# Patient Record
Sex: FEMALE | Race: WHITE | NOT HISPANIC OR LATINO
[De-identification: names, ages, dates, MRNs, and addresses within clinical notes are randomized per-mention and may not be internally consistent; named-entity substitution may affect disease eponyms.]

---

## 2018-02-09 ENCOUNTER — APPOINTMENT (OUTPATIENT)
Dept: FAMILY MEDICINE | Facility: CLINIC | Age: 53
End: 2018-02-09
Payer: COMMERCIAL

## 2018-02-09 VITALS
DIASTOLIC BLOOD PRESSURE: 74 MMHG | SYSTOLIC BLOOD PRESSURE: 116 MMHG | HEIGHT: 65 IN | OXYGEN SATURATION: 98 % | HEART RATE: 96 BPM | RESPIRATION RATE: 14 BRPM | BODY MASS INDEX: 43.32 KG/M2 | WEIGHT: 260 LBS

## 2018-02-09 DIAGNOSIS — Z82.49 FAMILY HISTORY OF ISCHEMIC HEART DISEASE AND OTHER DISEASES OF THE CIRCULATORY SYSTEM: ICD-10-CM

## 2018-02-09 DIAGNOSIS — Z83.49 FAMILY HISTORY OF OTHER ENDOCRINE, NUTRITIONAL AND METABOLIC DISEASES: ICD-10-CM

## 2018-02-09 DIAGNOSIS — Z12.31 ENCOUNTER FOR SCREENING MAMMOGRAM FOR MALIGNANT NEOPLASM OF BREAST: ICD-10-CM

## 2018-02-09 PROCEDURE — 99214 OFFICE O/P EST MOD 30 MIN: CPT | Mod: 25

## 2018-02-09 PROCEDURE — 36415 COLL VENOUS BLD VENIPUNCTURE: CPT

## 2018-02-12 LAB
ALBUMIN SERPL ELPH-MCNC: 4.1 G/DL
ALP BLD-CCNC: 66 U/L
ALT SERPL-CCNC: 20 U/L
ANION GAP SERPL CALC-SCNC: 14 MMOL/L
AST SERPL-CCNC: 20 U/L
BASOPHILS # BLD AUTO: 0.02 K/UL
BASOPHILS NFR BLD AUTO: 0.3 %
BILIRUB SERPL-MCNC: 0.5 MG/DL
BUN SERPL-MCNC: 12 MG/DL
CALCIUM SERPL-MCNC: 9.7 MG/DL
CHLORIDE SERPL-SCNC: 104 MMOL/L
CO2 SERPL-SCNC: 22 MMOL/L
CREAT SERPL-MCNC: 0.61 MG/DL
EOSINOPHIL # BLD AUTO: 0.25 K/UL
EOSINOPHIL NFR BLD AUTO: 3.3 %
GLUCOSE SERPL-MCNC: 85 MG/DL
HCT VFR BLD CALC: 41.5 %
HGB BLD-MCNC: 13.4 G/DL
IMM GRANULOCYTES NFR BLD AUTO: 0.1 %
LYMPHOCYTES # BLD AUTO: 1.95 K/UL
LYMPHOCYTES NFR BLD AUTO: 25.9 %
MAN DIFF?: NORMAL
MCHC RBC-ENTMCNC: 29.1 PG
MCHC RBC-ENTMCNC: 32.3 GM/DL
MCV RBC AUTO: 90.2 FL
MONOCYTES # BLD AUTO: 0.35 K/UL
MONOCYTES NFR BLD AUTO: 4.6 %
NEUTROPHILS # BLD AUTO: 4.96 K/UL
NEUTROPHILS NFR BLD AUTO: 65.8 %
PLATELET # BLD AUTO: 313 K/UL
POTASSIUM SERPL-SCNC: 4.4 MMOL/L
PROT SERPL-MCNC: 7 G/DL
RBC # BLD: 4.6 M/UL
RBC # FLD: 13.3 %
SODIUM SERPL-SCNC: 140 MMOL/L
TSH SERPL-ACNC: 1.54 UIU/ML
WBC # FLD AUTO: 7.54 K/UL

## 2018-05-07 ENCOUNTER — MEDICATION RENEWAL (OUTPATIENT)
Age: 53
End: 2018-05-07

## 2018-09-12 ENCOUNTER — APPOINTMENT (OUTPATIENT)
Dept: FAMILY MEDICINE | Facility: CLINIC | Age: 53
End: 2018-09-12
Payer: COMMERCIAL

## 2018-09-12 VITALS
OXYGEN SATURATION: 97 % | DIASTOLIC BLOOD PRESSURE: 76 MMHG | WEIGHT: 266 LBS | RESPIRATION RATE: 14 BRPM | BODY MASS INDEX: 44.27 KG/M2 | HEART RATE: 104 BPM | SYSTOLIC BLOOD PRESSURE: 112 MMHG | TEMPERATURE: 98.7 F

## 2018-09-12 LAB — CYTOLOGY CVX/VAG DOC THIN PREP: NORMAL

## 2018-09-12 PROCEDURE — 99213 OFFICE O/P EST LOW 20 MIN: CPT

## 2018-09-12 NOTE — PHYSICAL EXAM
[Ill-Appearing] : ill-appearing [Acutely Exhausted] : acutely exhausted [Looks Tired] : appears tired [Hoarse] : was hoarse [Normal Rhythm/Effort] : normal respiratory rhythm and effort [Clear Bilaterally] : the lungs were clear to auscultation bilaterally [Normal to Percussion] : the lungs were normal to percussion [Normal] : palpation of the chest was normal [Normal Rate] : normal [Normal S1] : normal S1 [Normal S2] : normal S2 [No Murmur] : no murmurs heard [No Pitting Edema] : no pitting edema present [2+] : left 2+ [No Abnormalities] : the abdominal aorta was not enlarged and no bruit was heard [S3] : no S3 [S4] : no S4 [Right Carotid Bruit] : no bruit heard over the right carotid [Left Carotid Bruit] : no bruit heard over the left carotid [Right Femoral Bruit] : no bruit heard over the right femoral artery [Left Femoral Bruit] : no bruit heard over the left femoral artery [de-identified] : nose so congested   ears wax in right and red in left TM

## 2018-09-12 NOTE — HISTORY OF PRESENT ILLNESS
[FreeTextEntry8] : Pt is here for head cold. Pt c/o bilateral ear pain worse on right ear, chills, sore throat, runny nose, head congestion, feeling tired for 1 day.

## 2018-09-12 NOTE — ASSESSMENT
[FreeTextEntry1] : Take antibiotics,  rest,  increase fluids, wash hands to prevent the spread of  infection . Take benedryl over the counter. Take tylenol or advil for fever or body aches. Follow up if no better or worse respiratory symptoms.\par rto for cerumen removal\par mammogram\par colonscopy

## 2018-09-12 NOTE — REVIEW OF SYSTEMS
[Earache] : earache [Nasal Discharge] : nasal discharge [Negative] : Respiratory [Sore Throat] : no sore throat

## 2019-02-14 ENCOUNTER — RX RENEWAL (OUTPATIENT)
Age: 54
End: 2019-02-14

## 2019-05-19 ENCOUNTER — RX RENEWAL (OUTPATIENT)
Age: 54
End: 2019-05-19

## 2019-08-08 ENCOUNTER — APPOINTMENT (OUTPATIENT)
Dept: FAMILY MEDICINE | Facility: CLINIC | Age: 54
End: 2019-08-08
Payer: COMMERCIAL

## 2019-08-08 ENCOUNTER — LABORATORY RESULT (OUTPATIENT)
Age: 54
End: 2019-08-08

## 2019-08-08 VITALS
RESPIRATION RATE: 14 BRPM | WEIGHT: 266 LBS | HEART RATE: 80 BPM | SYSTOLIC BLOOD PRESSURE: 112 MMHG | OXYGEN SATURATION: 98 % | BODY MASS INDEX: 44.32 KG/M2 | DIASTOLIC BLOOD PRESSURE: 80 MMHG | HEIGHT: 65 IN

## 2019-08-08 DIAGNOSIS — Z87.09 PERSONAL HISTORY OF OTHER DISEASES OF THE RESPIRATORY SYSTEM: ICD-10-CM

## 2019-08-08 PROCEDURE — 99214 OFFICE O/P EST MOD 30 MIN: CPT

## 2019-08-08 RX ORDER — BETAMETHASONE DIPROPIONATE 0.5 MG/G
0.05 CREAM TOPICAL TWICE DAILY
Qty: 60 | Refills: 3 | Status: COMPLETED | COMMUNITY
Start: 2018-02-09 | End: 2019-08-08

## 2019-08-08 RX ORDER — AMOXICILLIN AND CLAVULANATE POTASSIUM 875; 125 MG/1; MG/1
875-125 TABLET, COATED ORAL
Qty: 20 | Refills: 0 | Status: COMPLETED | COMMUNITY
Start: 2018-09-12 | End: 2019-08-08

## 2019-08-08 NOTE — ASSESSMENT
[FreeTextEntry1] : Basic cardiovascular prevention measures are advised including regular exercise, surveillance medical examination, and prudent portion-controlled low fat diet, rich in a variety of vegetables with minimal added sugars, refined starches, and no artificially hydrogenated oils.\par Labs drawn/ specimens obtained  in office on this date of service  for evaluation of   assessed conditions -  full blood work    to be run at [facility].\par ear irrigated. \par renew medications\par colonoscopy\par mammogram\par follow up gyn for pap\par We spent sufficient time to discuss aspects of care; questions were answered  to patient's satisfaction.The diagnosis and care plan were discussed with patient in detail.  Patient test results were  reviewed and explained in full. All questions and concerns  were answered to the best of my knowledge.\par

## 2019-08-08 NOTE — HISTORY OF PRESENT ILLNESS
[FreeTextEntry1] : pt presents for med check  [de-identified] : 52 yo wf her for follow up on hypothyroid . Her  is very sick and he has very bad diabetes and developed  sepsis and they had to amputate his leg. He is very sick and now has pneumonia. He is in [facility]. They are going to send him to rehab.  My brother past away in September . It has been a hell of a year. I have to get back on track and go to gyn and get mammo and colonoscopy . I just havent been able to  because of my .

## 2019-08-08 NOTE — PHYSICAL EXAM
[Normal] : normal rate, regular rhythm, normal S1 and S2 and no murmur heard [de-identified] : emotional [de-identified] : right ear wax

## 2019-08-08 NOTE — HEALTH RISK ASSESSMENT
[] : No [No] : In the past 12 months have you used drugs other than those required for medical reasons? No [No falls in past year] : Patient reported no falls in the past year [0] : 2) Feeling down, depressed, or hopeless: Not at all (0) [de-identified] : no [de-identified] : no [Audit-CScore] : 0 [de-identified] : normal [de-identified] : no [JFK3Mtfge] : 0

## 2019-08-12 LAB
ALBUMIN SERPL ELPH-MCNC: 4.3 G/DL
ALP BLD-CCNC: 62 U/L
ALT SERPL-CCNC: 20 U/L
ANION GAP SERPL CALC-SCNC: 11 MMOL/L
AST SERPL-CCNC: 15 U/L
BASOPHILS # BLD AUTO: 0.04 K/UL
BASOPHILS NFR BLD AUTO: 0.7 %
BILIRUB SERPL-MCNC: 0.8 MG/DL
BUN SERPL-MCNC: 13 MG/DL
CALCIUM SERPL-MCNC: 9.7 MG/DL
CHLORIDE SERPL-SCNC: 105 MMOL/L
CHOLEST SERPL-MCNC: 192 MG/DL
CHOLEST/HDLC SERPL: 3.2 RATIO
CO2 SERPL-SCNC: 26 MMOL/L
CREAT SERPL-MCNC: 0.62 MG/DL
EOSINOPHIL # BLD AUTO: 0.16 K/UL
EOSINOPHIL NFR BLD AUTO: 2.7 %
ESTIMATED AVERAGE GLUCOSE: 114 MG/DL
FOLATE SERPL-MCNC: >20 NG/ML
GLUCOSE SERPL-MCNC: 99 MG/DL
HBA1C MFR BLD HPLC: 5.6 %
HCT VFR BLD CALC: 45.1 %
HDLC SERPL-MCNC: 60 MG/DL
HGB BLD-MCNC: 14 G/DL
IMM GRANULOCYTES NFR BLD AUTO: 0.2 %
IRON SERPL-MCNC: 81 UG/DL
LDLC SERPL CALC-MCNC: 114 MG/DL
LYMPHOCYTES # BLD AUTO: 1.18 K/UL
LYMPHOCYTES NFR BLD AUTO: 20.3 %
MAN DIFF?: NORMAL
MCHC RBC-ENTMCNC: 28.2 PG
MCHC RBC-ENTMCNC: 31 GM/DL
MCV RBC AUTO: 90.9 FL
MONOCYTES # BLD AUTO: 0.37 K/UL
MONOCYTES NFR BLD AUTO: 6.4 %
NEUTROPHILS # BLD AUTO: 4.06 K/UL
NEUTROPHILS NFR BLD AUTO: 69.7 %
PLATELET # BLD AUTO: 289 K/UL
POTASSIUM SERPL-SCNC: 4.7 MMOL/L
PROT SERPL-MCNC: 6.6 G/DL
RBC # BLD: 4.96 M/UL
RBC # FLD: 13.5 %
SODIUM SERPL-SCNC: 142 MMOL/L
T3 SERPL-MCNC: 130 NG/DL
T3FREE SERPL-MCNC: 3.4 PG/ML
T3RU NFR SERPL: 1 TBI
T4 FREE SERPL-MCNC: 1.7 NG/DL
TRIGL SERPL-MCNC: 88 MG/DL
TSH SERPL-ACNC: 0.86 UIU/ML
VIT B12 SERPL-MCNC: 268 PG/ML
WBC # FLD AUTO: 5.82 K/UL

## 2020-09-03 ENCOUNTER — RX RENEWAL (OUTPATIENT)
Age: 55
End: 2020-09-03

## 2020-10-27 ENCOUNTER — APPOINTMENT (OUTPATIENT)
Dept: FAMILY MEDICINE | Facility: CLINIC | Age: 55
End: 2020-10-27
Payer: COMMERCIAL

## 2020-10-27 VITALS
OXYGEN SATURATION: 98 % | SYSTOLIC BLOOD PRESSURE: 124 MMHG | DIASTOLIC BLOOD PRESSURE: 82 MMHG | HEIGHT: 65 IN | TEMPERATURE: 97.8 F | WEIGHT: 285 LBS | RESPIRATION RATE: 14 BRPM | HEART RATE: 68 BPM | BODY MASS INDEX: 47.48 KG/M2

## 2020-10-27 DIAGNOSIS — Z23 ENCOUNTER FOR IMMUNIZATION: ICD-10-CM

## 2020-10-27 LAB — CYTOLOGY CVX/VAG DOC THIN PREP: NORMAL

## 2020-10-27 PROCEDURE — 90686 IIV4 VACC NO PRSV 0.5 ML IM: CPT

## 2020-10-27 PROCEDURE — 99072 ADDL SUPL MATRL&STAF TM PHE: CPT

## 2020-10-27 PROCEDURE — 36415 COLL VENOUS BLD VENIPUNCTURE: CPT

## 2020-10-27 PROCEDURE — G0008: CPT

## 2020-10-27 PROCEDURE — 99213 OFFICE O/P EST LOW 20 MIN: CPT | Mod: 25

## 2020-10-27 NOTE — ASSESSMENT
[FreeTextEntry1] : Labs drawn/ specimens obtained  in office on this date of service  for evaluation of   assessed conditions - tft     to be run at [facility].\par reduce to [id number] mcg \par  Information regarding flu shot reviewed. All questions answered.Flu shot .5 cc IM  right    deltoid . No reaction noted. Advised patients to wash hands to reduce the spread of infection.\par mammogram

## 2020-10-27 NOTE — REVIEW OF SYSTEMS
[Joint Pain] : joint pain [Insomnia] : insomnia [Negative] : Respiratory [FreeTextEntry9] : wrist pain and hand pain and foot pain

## 2020-10-27 NOTE — HISTORY OF PRESENT ILLNESS
[FreeTextEntry1] : patient presents for medication renewal\par  [de-identified] : 56 yo wf here for follow up on thyroid she is on levothyroxine 150 mcg\par she would like to lower the dose just a little. \par she is not sleeping she is still getting her menses. she has no hot flashes. she has 3 c coffee daily. \par she is working from home and she has to do a lot of computers and doesn’t have the proper desk and is getting cts \par I also have feet problems- left foot plantar fasciitis.\par I am doing exercises. but cant exercise too much

## 2020-10-28 LAB
T3 SERPL-MCNC: 121 NG/DL
T3FREE SERPL-MCNC: 3.11 PG/ML
T4 FREE SERPL-MCNC: 1.4 NG/DL
TSH SERPL-ACNC: 2.55 UIU/ML

## 2021-04-25 ENCOUNTER — RX RENEWAL (OUTPATIENT)
Age: 56
End: 2021-04-25

## 2021-07-30 ENCOUNTER — TRANSCRIPTION ENCOUNTER (OUTPATIENT)
Age: 56
End: 2021-07-30

## 2021-07-31 ENCOUNTER — APPOINTMENT (OUTPATIENT)
Dept: FAMILY MEDICINE | Facility: CLINIC | Age: 56
End: 2021-07-31
Payer: COMMERCIAL

## 2021-07-31 VITALS
WEIGHT: 286 LBS | OXYGEN SATURATION: 98 % | HEART RATE: 79 BPM | BODY MASS INDEX: 47.65 KG/M2 | DIASTOLIC BLOOD PRESSURE: 80 MMHG | HEIGHT: 65 IN | RESPIRATION RATE: 14 BRPM | SYSTOLIC BLOOD PRESSURE: 132 MMHG

## 2021-07-31 VITALS — TEMPERATURE: 98.2 F

## 2021-07-31 PROCEDURE — 36415 COLL VENOUS BLD VENIPUNCTURE: CPT

## 2021-07-31 PROCEDURE — 99213 OFFICE O/P EST LOW 20 MIN: CPT | Mod: 25

## 2021-07-31 NOTE — ASSESSMENT
[FreeTextEntry1] : Basic cardiovascular prevention measures are advised including regular exercise, surveillance medical examination, and prudent portion-controlled low fat diet, rich in a variety of vegetables with minimal added sugars, refined starches, and no artificially hydrogenated oils.\par Discussion and interpretation of previous tests , external notes( labs, radiology, specialist  , patient verbalized understanding.\par Prescription drug management trial abx for sinus. if no better follow up ent\par Labs drawn/ specimens obtained  in office on this date of service  for evaluation of   assessed conditions -    cbc cmp tft lipid  to be run at [facility].\par discussed gsc turmeric for joints

## 2021-07-31 NOTE — HISTORY OF PRESENT ILLNESS
[FreeTextEntry8] : pt c/o nose bleeds +ha +sinus pressure  x 2 episodes since Tuesday \par \par she takes motrin once in a while.\par + ha sinus at bridge of nose\par issues w allergy \par no coughing\par no menses since march\par she is perimenopausal\par no bruising\par no rectal bleeding.

## 2021-07-31 NOTE — PHYSICAL EXAM
[Normal] : no respiratory distress, lungs were clear to auscultation bilaterally and no accessory muscle use [de-identified] : no obvious artery or inflammation

## 2021-08-02 LAB
24R-OH-CALCIDIOL SERPL-MCNC: 23.4 PG/ML
ALBUMIN SERPL ELPH-MCNC: 4.4 G/DL
ALP BLD-CCNC: 78 U/L
ALT SERPL-CCNC: 32 U/L
ANION GAP SERPL CALC-SCNC: 10 MMOL/L
APTT BLD: 40.2 SEC
AST SERPL-CCNC: 23 U/L
BASOPHILS # BLD AUTO: 0.04 K/UL
BASOPHILS NFR BLD AUTO: 0.7 %
BILIRUB SERPL-MCNC: 0.9 MG/DL
BUN SERPL-MCNC: 12 MG/DL
CALCIUM SERPL-MCNC: 9.8 MG/DL
CHLORIDE SERPL-SCNC: 103 MMOL/L
CHOLEST SERPL-MCNC: 184 MG/DL
CO2 SERPL-SCNC: 26 MMOL/L
CREAT SERPL-MCNC: 0.6 MG/DL
EOSINOPHIL # BLD AUTO: 0.17 K/UL
EOSINOPHIL NFR BLD AUTO: 2.9 %
ESTIMATED AVERAGE GLUCOSE: 117 MG/DL
FERRITIN SERPL-MCNC: 68 NG/ML
FOLATE SERPL-MCNC: >20 NG/ML
GLUCOSE SERPL-MCNC: 103 MG/DL
HBA1C MFR BLD HPLC: 5.7 %
HCT VFR BLD CALC: 46 %
HDLC SERPL-MCNC: 59 MG/DL
HGB BLD-MCNC: 14.4 G/DL
IMM GRANULOCYTES NFR BLD AUTO: 0.2 %
INR PPP: 1.01 RATIO
IRON SATN MFR SERPL: 19 %
IRON SERPL-MCNC: 78 UG/DL
LDLC SERPL CALC-MCNC: 108 MG/DL
LYMPHOCYTES # BLD AUTO: 1.29 K/UL
LYMPHOCYTES NFR BLD AUTO: 22.2 %
MAGNESIUM SERPL-MCNC: 2 MG/DL
MAN DIFF?: NORMAL
MCHC RBC-ENTMCNC: 28.5 PG
MCHC RBC-ENTMCNC: 31.3 GM/DL
MCV RBC AUTO: 90.9 FL
MONOCYTES # BLD AUTO: 0.3 K/UL
MONOCYTES NFR BLD AUTO: 5.2 %
NEUTROPHILS # BLD AUTO: 4 K/UL
NEUTROPHILS NFR BLD AUTO: 68.8 %
NONHDLC SERPL-MCNC: 125 MG/DL
PLATELET # BLD AUTO: 255 K/UL
POTASSIUM SERPL-SCNC: 4.9 MMOL/L
PROT SERPL-MCNC: 7 G/DL
PT BLD: 12 SEC
RBC # BLD: 5.06 M/UL
RBC # FLD: 13.7 %
SODIUM SERPL-SCNC: 139 MMOL/L
T3 SERPL-MCNC: 130 NG/DL
T3FREE SERPL-MCNC: 3.07 PG/ML
T4 FREE SERPL-MCNC: 1.4 NG/DL
TIBC SERPL-MCNC: 399 UG/DL
TRIGL SERPL-MCNC: 87 MG/DL
TSH SERPL-ACNC: 1.83 UIU/ML
UIBC SERPL-MCNC: 321 UG/DL
VIT B12 SERPL-MCNC: 378 PG/ML
WBC # FLD AUTO: 5.81 K/UL

## 2021-08-24 ENCOUNTER — TRANSCRIPTION ENCOUNTER (OUTPATIENT)
Age: 56
End: 2021-08-24

## 2021-10-01 ENCOUNTER — APPOINTMENT (OUTPATIENT)
Dept: FAMILY MEDICINE | Facility: CLINIC | Age: 56
End: 2021-10-01
Payer: COMMERCIAL

## 2021-10-01 PROCEDURE — 99441: CPT

## 2021-10-01 RX ORDER — AMOXICILLIN AND CLAVULANATE POTASSIUM 875; 125 MG/1; MG/1
875-125 TABLET, COATED ORAL
Qty: 20 | Refills: 0 | Status: COMPLETED | COMMUNITY
Start: 2021-07-31 | End: 2021-10-01

## 2021-10-01 RX ORDER — TRAMADOL HYDROCHLORIDE 50 MG/1
50 TABLET, COATED ORAL
Qty: 60 | Refills: 5 | Status: COMPLETED | COMMUNITY
Start: 2018-02-09 | End: 2021-10-01

## 2021-10-01 NOTE — HISTORY OF PRESENT ILLNESS
[Home] : at home, [unfilled] , at the time of the visit. [Medical Office: ([facility])___] : at the medical office located in  [Verbal consent obtained from patient] : the patient, [unfilled] [FreeTextEntry8] : Patient initiated communication for concern via HIPAA secure platform  (Telemedicine )  for                      using              .Reviewed quarantine instructions and self isolation to limit spread of disease  as per BEN guidelines.  Patient is an established patient and has verbalized consent to be treated via telemedicine .\par Carlos daughter on Sat had co of allergies. she is not vaccinated. monday  had a bronchial respiratory co's and it got worse. daughter  got tested tuesday + rapid  test. Her  got a pcr test. tuesday he had a fever he went to hosp and was admitted\par She went for rapid test neg. pcr pending. Her concern is she feels a head cold and her throat is raspy and is coughing 3 d w phlegm, no fever  she has sense of taste and smell. she went to [facility]. the dr's suggested if she is neg and if she is not feeling well she should repeat test rapid.  she is tired and overwhelmed\par Sera had test was neg

## 2021-10-01 NOTE — ASSESSMENT
[FreeTextEntry1] : Discussed the following risk reducing strategies. - Wash hands with soap and water , use alcohol based hand  when hand washing is not an option. Maintain social distancing of at least 6 feet whenever possible , avoid hand shaking, avoid touching eyes, nose and mouth, cover mouth when coughing or sneezing, avoid close contact with sick people. seek medical help if fever, or worse symptoms cough chest pain, or shortness of breath.\par Please note this assessment was done using telemedicine as a result of social distancing due to the outbreak of COVID 19 .\par rapid neg. pcr pending. if she gets worse suggest repeat testing she might have tested too soon. Or go to the hospital  if worse .

## 2021-10-04 ENCOUNTER — NON-APPOINTMENT (OUTPATIENT)
Age: 56
End: 2021-10-04

## 2022-01-24 ENCOUNTER — NON-APPOINTMENT (OUTPATIENT)
Age: 57
End: 2022-01-24

## 2022-01-24 ENCOUNTER — APPOINTMENT (OUTPATIENT)
Dept: FAMILY MEDICINE | Facility: CLINIC | Age: 57
End: 2022-01-24
Payer: COMMERCIAL

## 2022-01-24 VITALS
RESPIRATION RATE: 14 BRPM | OXYGEN SATURATION: 97 % | HEART RATE: 98 BPM | WEIGHT: 286 LBS | HEIGHT: 65 IN | BODY MASS INDEX: 47.65 KG/M2 | SYSTOLIC BLOOD PRESSURE: 142 MMHG | DIASTOLIC BLOOD PRESSURE: 76 MMHG

## 2022-01-24 DIAGNOSIS — Z00.00 ENCOUNTER FOR GENERAL ADULT MEDICAL EXAMINATION W/OUT ABNORMAL FINDINGS: ICD-10-CM

## 2022-01-24 DIAGNOSIS — Z86.69 PERSONAL HISTORY OF OTHER DISEASES OF THE NERVOUS SYSTEM AND SENSE ORGANS: ICD-10-CM

## 2022-01-24 DIAGNOSIS — R53.82 CHRONIC FATIGUE, UNSPECIFIED: ICD-10-CM

## 2022-01-24 DIAGNOSIS — Z12.11 ENCOUNTER FOR SCREENING FOR MALIGNANT NEOPLASM OF COLON: ICD-10-CM

## 2022-01-24 LAB — CYTOLOGY CVX/VAG DOC THIN PREP: NORMAL

## 2022-01-24 PROCEDURE — 93000 ELECTROCARDIOGRAM COMPLETE: CPT

## 2022-01-24 PROCEDURE — 99396 PREV VISIT EST AGE 40-64: CPT | Mod: 25

## 2022-01-24 RX ORDER — DICLOFENAC SODIUM 10 MG/G
1 GEL TOPICAL
Qty: 100 | Refills: 0 | Status: COMPLETED | COMMUNITY
Start: 2017-06-22 | End: 2022-01-24

## 2022-01-24 RX ORDER — MELOXICAM 15 MG/1
15 TABLET ORAL DAILY
Qty: 90 | Refills: 0 | Status: COMPLETED | COMMUNITY
Start: 2018-02-09 | End: 2022-01-24

## 2022-01-24 RX ORDER — MOMETASONE FUROATE 1 MG/G
0.1 CREAM TOPICAL TWICE DAILY
Qty: 1 | Refills: 6 | Status: COMPLETED | COMMUNITY
Start: 2018-02-09 | End: 2022-01-24

## 2022-01-24 NOTE — HISTORY OF PRESENT ILLNESS
[FreeTextEntry1] : Patient presents for physical exam [de-identified] : Presenting in office for physical examination, she lost her  in october to COVID and she is having lots of joint pain and fatigue. She was told her mother had lupus but is not sure. She is tearful on examination, and is concerned aboout her health. Her knees hands arms and she has no energy to walk or exercise.

## 2022-01-24 NOTE — PLAN
[FreeTextEntry1] : Physical examination\par will order routine lab work to be drawn at outside lab\par \par fatigue and joint pain\par will order inflammatory labs/tick panel/ r[facility]\par \par recommend healthy lifestyle including exercising for 150 minutes of engaging cardio per week and Mediterranean diet. \par \par hypothyroid\par will order thyroid panel\par thyroid u.s as she has not had one in a long time\par \par recommend colonoscopy- referral given\par reviewed mammogram- BIRADS1 negative\par \par she has been grieving since the loss of her \par she has a good support system at home and is feeling better as time passes, she is not interested in taking medication but will call if she feels helpless\par \par EKG - t wave abnormality\par recommend she see cardiology again\par denies chest pain but does get SOB with exertion\par \par will call with results, RTO as needed.

## 2022-01-24 NOTE — HEALTH RISK ASSESSMENT
[Fair] :  ~his/her~ mood as fair [Intercurrent Urgi Care visits] : went to urgent care [Never] : Never [No] : In the past 12 months have you used drugs other than those required for medical reasons? No [No falls in past year] : Patient reported no falls in the past year [3] : 2) Feeling down, depressed, or hopeless for nearly every day (3) [PHQ-2 Positive] : PHQ-2 Positive [No Retinopathy] : No retinopathy [Patient declined bone density test] : Patient declined bone density test [HIV test declined] : HIV test declined [Hepatitis C test declined] : Hepatitis C test declined [None] : None [Alone] : lives alone [Employed] : employed [College] : College [] :  [Feels Safe at Home] : Feels safe at home [Fully functional (bathing, dressing, toileting, transferring, walking, feeding)] : Fully functional (bathing, dressing, toileting, transferring, walking, feeding) [Fully functional (using the telephone, shopping, preparing meals, housekeeping, doing laundry, using] : Fully functional and needs no help or supervision to perform IADLs (using the telephone, shopping, preparing meals, housekeeping, doing laundry, using transportation, managing medications and managing finances) [Reports normal functional visual acuity (ie: able to read med bottle)] : Reports normal functional visual acuity [Smoke Detector] : smoke detector [Carbon Monoxide Detector] : carbon monoxide detector [Safety elements used in home] : safety elements used in home [Seat Belt] :  uses seat belt [Sunscreen] : uses sunscreen [Patient/Caregiver not ready to engage] : , patient/caregiver not ready to engage [Several Days (1)] : 2.) Feeling down, depressed or hopeless? Several days [1/2 of Days or More (2)] : 4.) Feeling tired or having little energy? Half the days or more [Not at All (0)] : 8.) Moving or speaking so slowly that other people could have noticed, or the opposite, moving or speaking faster than usual? Not at all [Mild] : severity of depression is mild [Not at all] : How difficult have these problems made it for you to do your work, take care of things at home, or get along with people? Not at all [PHQ-9 Positive] : PHQ-9 Positive [FreeTextEntry1] : joint pain, fatigue.  [de-identified] : COVID Testing [de-identified] : occasional walking  [de-identified] : denies  [XSQ8Mfxxs] : 6 [[id number]] : 6 [EyeExamDate] : 2021 [Change in mental status noted] : No change in mental status noted [Language] : denies difficulty with language [Behavior] : denies difficulty with behavior [Learning/Retaining New Information] : denies difficulty learning/retaining new information [Handling Complex Tasks] : denies difficulty handling complex tasks [Reasoning] : denies difficulty with reasoning [Spatial Ability and Orientation] : denies difficulty with spatial ability and orientation [Sexually Active] : not sexually active [High Risk Behavior] : no high risk behavior [Reports changes in hearing] : Reports no changes in hearing [Reports changes in vision] : Reports no changes in vision [Reports changes in dental health] : Reports no changes in dental health [TB Exposure] : is not being exposed to tuberculosis [Caregiver Concerns] : does not have caregiver concerns [MammogramDate] : 8/2021 [PapSmearDate] : 8/2021 [ColonoscopyComments] : rx given  [FreeTextEntry4] : give health care proxy

## 2022-02-16 ENCOUNTER — NON-APPOINTMENT (OUTPATIENT)
Age: 57
End: 2022-02-16

## 2022-02-16 ENCOUNTER — APPOINTMENT (OUTPATIENT)
Dept: CARDIOLOGY | Facility: CLINIC | Age: 57
End: 2022-02-16
Payer: COMMERCIAL

## 2022-02-16 VITALS
SYSTOLIC BLOOD PRESSURE: 140 MMHG | DIASTOLIC BLOOD PRESSURE: 80 MMHG | BODY MASS INDEX: 47.65 KG/M2 | HEART RATE: 94 BPM | HEIGHT: 65 IN | OXYGEN SATURATION: 98 % | WEIGHT: 286 LBS

## 2022-02-16 DIAGNOSIS — R94.31 ABNORMAL ELECTROCARDIOGRAM [ECG] [EKG]: ICD-10-CM

## 2022-02-16 PROCEDURE — 93000 ELECTROCARDIOGRAM COMPLETE: CPT

## 2022-02-16 PROCEDURE — 99244 OFF/OP CNSLTJ NEW/EST MOD 40: CPT | Mod: 25

## 2022-02-16 NOTE — DISCUSSION/SUMMARY
[FreeTextEntry1] : Pt is a 55 y/o F with hypothyroidism, BMI 47 p/w abnormal ECG\par Will check transthoracic echocardiogram to evaluate left ventricular function and assess for any structural abnormalities\par Check nuc stress test to eval for ishemia\par BP mildly elevated - monitor closely\par The described plan was discussed with the pt.  All questions and concerns were addressed to the best of my knowledge.

## 2022-02-16 NOTE — HISTORY OF PRESENT ILLNESS
[FreeTextEntry1] : Pt is a 55 y/o F who is referred here today by their PCP for evaluation abnormal ECG.  She has PMH hypothyroidism, BMI 47.  She states that she is feeling well overall - denies CP, SOB, diaphoresis, palpitations, dizziness, syncope, LE edema, PND, orthopnea. \par   from COVID 10/2021\par COVID vaccine 2021, booster 2022\par \par PMH: hypothyroidism, BMI 47\par Family hx: father HTN\par Smoking status: never\par no ETOH\par no drug use\par Current exercise: none\par Daily water intake: 36-50 oz\par Daily caffeine intake: 2 cups coffee\par OTC medications: MVI, B12, vit C/D\par Previous cardiac testing: none\par Previous hospitalizations: none\par 2 children - no problem with pregnancies\par

## 2022-03-18 ENCOUNTER — APPOINTMENT (OUTPATIENT)
Dept: CARDIOLOGY | Facility: CLINIC | Age: 57
End: 2022-03-18
Payer: COMMERCIAL

## 2022-03-18 PROCEDURE — 93306 TTE W/DOPPLER COMPLETE: CPT

## 2022-03-23 ENCOUNTER — TRANSCRIPTION ENCOUNTER (OUTPATIENT)
Age: 57
End: 2022-03-23

## 2022-03-24 ENCOUNTER — APPOINTMENT (OUTPATIENT)
Dept: CARDIOLOGY | Facility: CLINIC | Age: 57
End: 2022-03-24
Payer: COMMERCIAL

## 2022-03-24 PROCEDURE — 93018 CV STRESS TEST I&R ONLY: CPT

## 2022-03-24 PROCEDURE — 93017 CV STRESS TEST TRACING ONLY: CPT

## 2022-03-24 PROCEDURE — 78452 HT MUSCLE IMAGE SPECT MULT: CPT

## 2022-03-24 PROCEDURE — A9500: CPT

## 2022-03-29 ENCOUNTER — TRANSCRIPTION ENCOUNTER (OUTPATIENT)
Age: 57
End: 2022-03-29

## 2022-03-31 ENCOUNTER — TRANSCRIPTION ENCOUNTER (OUTPATIENT)
Age: 57
End: 2022-03-31

## 2022-04-22 ENCOUNTER — TRANSCRIPTION ENCOUNTER (OUTPATIENT)
Age: 57
End: 2022-04-22

## 2022-04-22 ENCOUNTER — RX RENEWAL (OUTPATIENT)
Age: 57
End: 2022-04-22

## 2022-06-07 ENCOUNTER — APPOINTMENT (OUTPATIENT)
Dept: FAMILY MEDICINE | Facility: CLINIC | Age: 57
End: 2022-06-07
Payer: COMMERCIAL

## 2022-06-07 VITALS
RESPIRATION RATE: 15 BRPM | BODY MASS INDEX: 48.65 KG/M2 | TEMPERATURE: 98 F | DIASTOLIC BLOOD PRESSURE: 80 MMHG | OXYGEN SATURATION: 98 % | HEART RATE: 88 BPM | HEIGHT: 65 IN | SYSTOLIC BLOOD PRESSURE: 118 MMHG | WEIGHT: 292 LBS

## 2022-06-07 DIAGNOSIS — Z87.898 PERSONAL HISTORY OF OTHER SPECIFIED CONDITIONS: ICD-10-CM

## 2022-06-07 DIAGNOSIS — R06.02 SHORTNESS OF BREATH: ICD-10-CM

## 2022-06-07 DIAGNOSIS — Z86.19 PERSONAL HISTORY OF OTHER INFECTIOUS AND PARASITIC DISEASES: ICD-10-CM

## 2022-06-07 DIAGNOSIS — Z87.09 PERSONAL HISTORY OF OTHER DISEASES OF THE RESPIRATORY SYSTEM: ICD-10-CM

## 2022-06-07 PROCEDURE — 36415 COLL VENOUS BLD VENIPUNCTURE: CPT

## 2022-06-07 PROCEDURE — 99213 OFFICE O/P EST LOW 20 MIN: CPT | Mod: 25

## 2022-06-07 NOTE — REVIEW OF SYSTEMS
[Joint Pain] : joint pain [Negative] : Respiratory [Recent Change In Weight] : ~T recent weight change [Joint Stiffness] : joint stiffness

## 2022-06-08 LAB
BASOPHILS # BLD AUTO: 0.06 K/UL
BASOPHILS NFR BLD AUTO: 0.9 %
EOSINOPHIL # BLD AUTO: 0.23 K/UL
EOSINOPHIL NFR BLD AUTO: 3.4 %
HCT VFR BLD CALC: 44.4 %
HGB BLD-MCNC: 13.9 G/DL
IMM GRANULOCYTES NFR BLD AUTO: 0.3 %
LYMPHOCYTES # BLD AUTO: 1.42 K/UL
LYMPHOCYTES NFR BLD AUTO: 21.3 %
MAN DIFF?: NORMAL
MCHC RBC-ENTMCNC: 28.5 PG
MCHC RBC-ENTMCNC: 31.3 GM/DL
MCV RBC AUTO: 91.2 FL
MONOCYTES # BLD AUTO: 0.41 K/UL
MONOCYTES NFR BLD AUTO: 6.1 %
NEUTROPHILS # BLD AUTO: 4.53 K/UL
NEUTROPHILS NFR BLD AUTO: 68 %
PLATELET # BLD AUTO: 254 K/UL
RBC # BLD: 4.87 M/UL
RBC # FLD: 13.9 %
T3 SERPL-MCNC: 119 NG/DL
T3FREE SERPL-MCNC: 3 PG/ML
T4 FREE SERPL-MCNC: 1.6 NG/DL
TSH SERPL-ACNC: 2.4 UIU/ML
WBC # FLD AUTO: 6.67 K/UL

## 2022-06-09 LAB
ALBUMIN SERPL ELPH-MCNC: 4.6 G/DL
ALP BLD-CCNC: 77 U/L
ALT SERPL-CCNC: 28 U/L
ANION GAP SERPL CALC-SCNC: 13 MMOL/L
AST SERPL-CCNC: 25 U/L
BILIRUB SERPL-MCNC: 0.8 MG/DL
BUN SERPL-MCNC: 13 MG/DL
CALCIUM SERPL-MCNC: 9.6 MG/DL
CHLORIDE SERPL-SCNC: 102 MMOL/L
CO2 SERPL-SCNC: 27 MMOL/L
CREAT SERPL-MCNC: 0.69 MG/DL
EGFR: 102 ML/MIN/1.73M2
GLUCOSE SERPL-MCNC: 102 MG/DL
POTASSIUM SERPL-SCNC: 4.5 MMOL/L
PROT SERPL-MCNC: 7.1 G/DL
SODIUM SERPL-SCNC: 142 MMOL/L

## 2022-06-11 NOTE — HISTORY OF PRESENT ILLNESS
[FreeTextEntry1] : Patient presents for medication renewals and labs  [de-identified] : 57 yo wf here for follow up on hypothyroidism\par \par she had her cpe- she  complaining of aches and was dx w RA . she sees  rheumatology Dr Cotter . she wants me to see derm to rule out psoriatic arthritis.  She sees Maureen. Anuja Genao. in July\par I am feeling better. . Dr Cotter did a whole work up - mri on her back\par \par My   in October of covid. We were together 34 y. \par I knew he was sick w diabetes and chronic kidney disease. he was only 61 y  He was retired and I was going to retire too\par I had covid and i got mab and was better in 24 hrs. for some reason they did not give it to him. He  on 10/7/22 I was devastated.

## 2022-06-11 NOTE — ASSESSMENT
[FreeTextEntry1] : Basic cardiovascular prevention measures are advised including regular exercise, surveillance medical examination, and prudent portion-controlled low fat diet, rich in a variety of vegetables with minimal added sugars, refined starches, and no artificially hydrogenated oils.\par Discussion and interpretation of previous tests , external notes( labs, radiology, specialist  , patient verbalized understanding.\par Prescription drug management\par will renew levothyroxine when labs results in\par thyroid sonogram\par colonoscopy screening neoplasm

## 2022-06-11 NOTE — HEALTH RISK ASSESSMENT
[Never] : Never [No] : In the past 12 months have you used drugs other than those required for medical reasons? No [No falls in past year] : Patient reported no falls in the past year [Intercurrent ED visits] : went to ED [0] : 2) Feeling down, depressed, or hopeless: Not at all (0) [PHQ-2 Negative - No further assessment needed] : PHQ-2 Negative - No further assessment needed [de-identified] : covid-mab [Audit-CScore] : 0 [VIV2Pbcpr] : 0

## 2023-01-21 ENCOUNTER — RX RENEWAL (OUTPATIENT)
Age: 58
End: 2023-01-21

## 2023-01-23 ENCOUNTER — TRANSCRIPTION ENCOUNTER (OUTPATIENT)
Age: 58
End: 2023-01-23

## 2023-02-28 ENCOUNTER — NON-APPOINTMENT (OUTPATIENT)
Age: 58
End: 2023-02-28

## 2023-02-28 ENCOUNTER — APPOINTMENT (OUTPATIENT)
Dept: FAMILY MEDICINE | Facility: CLINIC | Age: 58
End: 2023-02-28
Payer: COMMERCIAL

## 2023-02-28 VITALS
BODY MASS INDEX: 48.82 KG/M2 | WEIGHT: 293 LBS | RESPIRATION RATE: 14 BRPM | HEART RATE: 79 BPM | SYSTOLIC BLOOD PRESSURE: 140 MMHG | DIASTOLIC BLOOD PRESSURE: 80 MMHG | HEIGHT: 65 IN | OXYGEN SATURATION: 99 %

## 2023-02-28 VITALS — TEMPERATURE: 97.3 F

## 2023-02-28 DIAGNOSIS — L30.8 OTHER SPECIFIED DERMATITIS: ICD-10-CM

## 2023-02-28 DIAGNOSIS — L40.50 ARTHROPATHIC PSORIASIS, UNSPECIFIED: ICD-10-CM

## 2023-02-28 DIAGNOSIS — S83.249A OTHER TEAR OF MEDIAL MENISCUS, CURRENT INJURY, UNSPECIFIED KNEE, INITIAL ENCOUNTER: ICD-10-CM

## 2023-02-28 DIAGNOSIS — K76.0 FATTY (CHANGE OF) LIVER, NOT ELSEWHERE CLASSIFIED: ICD-10-CM

## 2023-02-28 DIAGNOSIS — Z87.39 PERSONAL HISTORY OF OTHER DISEASES OF THE MUSCULOSKELETAL SYSTEM AND CONNECTIVE TISSUE: ICD-10-CM

## 2023-02-28 PROCEDURE — 36415 COLL VENOUS BLD VENIPUNCTURE: CPT

## 2023-02-28 PROCEDURE — 93000 ELECTROCARDIOGRAM COMPLETE: CPT

## 2023-02-28 PROCEDURE — 99396 PREV VISIT EST AGE 40-64: CPT | Mod: 25

## 2023-02-28 RX ORDER — FOLIC ACID 1 MG/1
1 TABLET ORAL
Refills: 0 | Status: ACTIVE | COMMUNITY

## 2023-02-28 RX ORDER — METHOTREXATE 2.5 MG/1
2.5 TABLET ORAL
Refills: 0 | Status: ACTIVE | COMMUNITY

## 2023-02-28 RX ORDER — HYDROXYCHLOROQUINE SULFATE 200 MG/1
200 TABLET, FILM COATED ORAL
Qty: 60 | Refills: 0 | Status: DISCONTINUED | COMMUNITY
Start: 2022-03-29 | End: 2023-02-28

## 2023-02-28 NOTE — ASSESSMENT
[FreeTextEntry1] : 56 yo wf here for cpe. newly dx RA Psoriatic arthritis. she is under rheumatology care. SHe has methotrexate, naproxyn and acupuncture and she is starting to feel better. SHe is due for colonoscopy\par Mammo utd\par Pap utd\par EKG performed on this day in the office  to evaluate for any ischemia, new arrhythmia, and for any significant changes and reviewed by MIAH Patterson. It is stable.\par Labs drawn/ specimens obtained  in office on this date of service  for evaluation of   assessed conditions -    physical  to be run at [facility].\par renew meds based on labs. \par immunization utd\par follow up routine eye care, dermatology

## 2023-02-28 NOTE — COUNSELING
[None] : None [Good understanding] : Patient has a good understanding of lifestyle changes and steps needed to achieve self management goal [Fall prevention counseling provided] : Fall prevention counseling provided [Potential consequences of obesity discussed] : Potential consequences of obesity discussed [Benefits of weight loss discussed] : Benefits of weight loss discussed [Encouraged to maintain food diary] : Encouraged to maintain food diary [Encouraged to increase physical activity] : Encouraged to increase physical activity [Encouraged to use exercise tracking device] : Encouraged to use exercise tracking device

## 2023-02-28 NOTE — PHYSICAL EXAM
[No Acute Distress] : no acute distress [Well Nourished] : well nourished [Well Developed] : well developed [Well-Appearing] : well-appearing [Normal Sclera/Conjunctiva] : normal sclera/conjunctiva [PERRL] : pupils equal round and reactive to light [EOMI] : extraocular movements intact [Normal Outer Ear/Nose] : the outer ears and nose were normal in appearance [Normal Oropharynx] : the oropharynx was normal [No JVD] : no jugular venous distention [No Lymphadenopathy] : no lymphadenopathy [Supple] : supple [Thyroid Normal, No Nodules] : the thyroid was normal and there were no nodules present [No Respiratory Distress] : no respiratory distress  [No Accessory Muscle Use] : no accessory muscle use [Clear to Auscultation] : lungs were clear to auscultation bilaterally [Normal Rate] : normal rate  [Regular Rhythm] : with a regular rhythm [Normal S1, S2] : normal S1 and S2 [No Murmur] : no murmur heard [No Carotid Bruits] : no carotid bruits [No Abdominal Bruit] : a ~M bruit was not heard ~T in the abdomen [No Varicosities] : no varicosities [Pedal Pulses Present] : the pedal pulses are present [No Edema] : there was no peripheral edema [No Palpable Aorta] : no palpable aorta [No Extremity Clubbing/Cyanosis] : no extremity clubbing/cyanosis [Soft] : abdomen soft [Non Tender] : non-tender [Non-distended] : non-distended [No Masses] : no abdominal mass palpated [No HSM] : no HSM [Normal Bowel Sounds] : normal bowel sounds [No CVA Tenderness] : no CVA  tenderness [No Spinal Tenderness] : no spinal tenderness [No Joint Swelling] : no joint swelling [Grossly Normal Strength/Tone] : grossly normal strength/tone [No Rash] : no rash [Coordination Grossly Intact] : coordination grossly intact [No Focal Deficits] : no focal deficits [Normal Gait] : normal gait [Deep Tendon Reflexes (DTR)] : deep tendon reflexes were 2+ and symmetric [Normal Affect] : the affect was normal [Normal Insight/Judgement] : insight and judgment were intact [de-identified] : obese

## 2023-02-28 NOTE — HEALTH RISK ASSESSMENT
[Fair] : ~his/her~ current health as fair  [Intercurrent Urgi Care visits] : went to urgent care [Never (0 pts)] : Never (0 points) [No] : In the past 12 months have you used drugs other than those required for medical reasons? No [No falls in past year] : Patient reported no falls in the past year [Mild] : severity of depression is mild [No Retinopathy] : No retinopathy [Patient declined bone density test] : Patient declined bone density test [HIV test declined] : HIV test declined [Hepatitis C test declined] : Hepatitis C test declined [None] : None [Alone] : lives alone [Employed] : employed [College] : College [] :  [Feels Safe at Home] : Feels safe at home [Fully functional (bathing, dressing, toileting, transferring, walking, feeding)] : Fully functional (bathing, dressing, toileting, transferring, walking, feeding) [Fully functional (using the telephone, shopping, preparing meals, housekeeping, doing laundry, using] : Fully functional and needs no help or supervision to perform IADLs (using the telephone, shopping, preparing meals, housekeeping, doing laundry, using transportation, managing medications and managing finances) [Reports normal functional visual acuity (ie: able to read med bottle)] : Reports normal functional visual acuity [Smoke Detector] : smoke detector [Carbon Monoxide Detector] : carbon monoxide detector [Safety elements used in home] : safety elements used in home [Seat Belt] :  uses seat belt [Sunscreen] : uses sunscreen [Never] : Never [Good] : ~his/her~  mood as  good [0] : 2) Feeling down, depressed, or hopeless: Not at all (0) [PHQ-2 Negative - No further assessment needed] : PHQ-2 Negative - No further assessment needed [Not at All (0)] : 9.) Thoughts that you would be off dead or of hurting yourself in some way? Not at all [PHQ-9 Negative - No further assessment needed] : PHQ-9 Negative - No further assessment needed [I have developed a follow-up plan documented below in the note.] : I have developed a follow-up plan documented below in the note. [Patient declined colonoscopy] : Patient declined colonoscopy [# of Members in Household ___] :  household currently consist of [unfilled] member(s) [# Of Children ___] : has [unfilled] children [With Patient/Caregiver] : , with patient/caregiver [Designated Healthcare Proxy] : Designated healthcare proxy [Name: ___] : Health Care Proxy's Name: [unfilled]  [Relationship: ___] : Relationship: [unfilled] [Aggressive treatment] : aggressive treatment [FreeTextEntry1] : cpe [de-identified] : COVID Testing [de-identified] : rheum cardio derm [Audit-CScore] : 0 [de-identified] : occasional walking  [de-identified] : no [[id number]] : 6 [NAJ0Iiyqs] : 0 [EyeExamDate] : 2022 [Change in mental status noted] : No change in mental status noted [Language] : denies difficulty with language [Behavior] : denies difficulty with behavior [Learning/Retaining New Information] : denies difficulty learning/retaining new information [Handling Complex Tasks] : denies difficulty handling complex tasks [Reasoning] : denies difficulty with reasoning [Spatial Ability and Orientation] : denies difficulty with spatial ability and orientation [Sexually Active] : not sexually active [High Risk Behavior] : no high risk behavior [Reports changes in hearing] : Reports no changes in hearing [Reports changes in vision] : Reports no changes in vision [Reports changes in dental health] : Reports no changes in dental health [TB Exposure] : is not being exposed to tuberculosis [Caregiver Concerns] : does not have caregiver concerns [MammogramDate] : 6/2021 [PapSmearDate] : 9/2021 [ColonoscopyDate] : 2023 [ColonoscopyComments] : rx given  [AdvancecareDate] : 2/28/23 [FreeTextEntry4] : give health care proxy

## 2023-02-28 NOTE — HISTORY OF PRESENT ILLNESS
[FreeTextEntry1] : Patient presents for physical exam [de-identified] : Presenting in office for physical examination, \par she is thankful for work. " it keeps me going"\par I had a rough year. I have RA and Psoriatic arthritis. \par They started me on plaquenil but now I am on methotrexate 2.5 mg  8 tab per week. I am on naproxyn. I started going to acupuncture  Dr Vinson in [facility] and just this week I feel good. I don’t know if it is the naproxyn or the acupuncture I will continue it bc I feel good. I had such pain I was not able to move. I know I need to lose weight.\par I need colonoscopy\par I think I had mammogram\par I was told I have a fatty liver. I need to see gastro\par my knee is bothering me . I need to see ortho\par \par 1/22 cpe \par she lost her  in october to COVID and she is having lots of joint pain and fatigue. She was told her mother had lupus but is not sure. She is tearful on examination, and is concerned about her health. Her knees hands arms and she has no energy to walk or exercise.

## 2023-02-28 NOTE — REVIEW OF SYSTEMS
[Joint Pain] : joint pain [Negative] : Heme/Lymph [Recent Change In Weight] : ~T recent weight change [Skin Rash] : skin rash

## 2023-03-01 LAB
ALBUMIN SERPL ELPH-MCNC: 4.4 G/DL
ALP BLD-CCNC: 75 U/L
ALT SERPL-CCNC: 35 U/L
ANION GAP SERPL CALC-SCNC: 13 MMOL/L
AST SERPL-CCNC: 24 U/L
BASOPHILS # BLD AUTO: 0.04 K/UL
BASOPHILS NFR BLD AUTO: 0.8 %
BILIRUB SERPL-MCNC: 1.1 MG/DL
BUN SERPL-MCNC: 14 MG/DL
CALCIUM SERPL-MCNC: 9.4 MG/DL
CHLORIDE SERPL-SCNC: 103 MMOL/L
CHOLEST SERPL-MCNC: 185 MG/DL
CO2 SERPL-SCNC: 25 MMOL/L
CREAT SERPL-MCNC: 0.56 MG/DL
EGFR: 107 ML/MIN/1.73M2
EOSINOPHIL # BLD AUTO: 0.22 K/UL
EOSINOPHIL NFR BLD AUTO: 4.5 %
ESTIMATED AVERAGE GLUCOSE: 111 MG/DL
FERRITIN SERPL-MCNC: 112 NG/ML
FOLATE SERPL-MCNC: >20 NG/ML
GLUCOSE SERPL-MCNC: 100 MG/DL
HBA1C MFR BLD HPLC: 5.5 %
HCT VFR BLD CALC: 42 %
HDLC SERPL-MCNC: 68 MG/DL
HGB BLD-MCNC: 13.6 G/DL
IMM GRANULOCYTES NFR BLD AUTO: 0.2 %
IRON SATN MFR SERPL: 27 %
IRON SERPL-MCNC: 99 UG/DL
LDLC SERPL CALC-MCNC: 100 MG/DL
LYMPHOCYTES # BLD AUTO: 1.07 K/UL
LYMPHOCYTES NFR BLD AUTO: 21.8 %
MAGNESIUM SERPL-MCNC: 1.9 MG/DL
MAN DIFF?: NORMAL
MCHC RBC-ENTMCNC: 30.4 PG
MCHC RBC-ENTMCNC: 32.4 GM/DL
MCV RBC AUTO: 93.8 FL
MONOCYTES # BLD AUTO: 0.28 K/UL
MONOCYTES NFR BLD AUTO: 5.7 %
NEUTROPHILS # BLD AUTO: 3.28 K/UL
NEUTROPHILS NFR BLD AUTO: 67 %
NONHDLC SERPL-MCNC: 117 MG/DL
PLATELET # BLD AUTO: 267 K/UL
POTASSIUM SERPL-SCNC: 4.6 MMOL/L
PROT SERPL-MCNC: 6.8 G/DL
RBC # BLD: 4.48 M/UL
RBC # FLD: 14.3 %
SODIUM SERPL-SCNC: 141 MMOL/L
T3 SERPL-MCNC: 119 NG/DL
T3FREE SERPL-MCNC: 2.74 PG/ML
T4 FREE SERPL-MCNC: 1.4 NG/DL
TIBC SERPL-MCNC: 372 UG/DL
TRIGL SERPL-MCNC: 84 MG/DL
TSH SERPL-ACNC: 1.66 UIU/ML
UIBC SERPL-MCNC: 273 UG/DL
VIT B12 SERPL-MCNC: 474 PG/ML
WBC # FLD AUTO: 4.9 K/UL

## 2023-05-04 ENCOUNTER — APPOINTMENT (OUTPATIENT)
Dept: FAMILY MEDICINE | Facility: CLINIC | Age: 58
End: 2023-05-04
Payer: COMMERCIAL

## 2023-05-04 VITALS
BODY MASS INDEX: 48.82 KG/M2 | WEIGHT: 293 LBS | HEART RATE: 69 BPM | RESPIRATION RATE: 14 BRPM | OXYGEN SATURATION: 99 % | HEIGHT: 65 IN | TEMPERATURE: 98 F

## 2023-05-04 VITALS — DIASTOLIC BLOOD PRESSURE: 78 MMHG | SYSTOLIC BLOOD PRESSURE: 120 MMHG

## 2023-05-04 DIAGNOSIS — E66.01 MORBID (SEVERE) OBESITY DUE TO EXCESS CALORIES: ICD-10-CM

## 2023-05-04 DIAGNOSIS — M06.9 RHEUMATOID ARTHRITIS, UNSPECIFIED: ICD-10-CM

## 2023-05-04 PROCEDURE — 69210 REMOVE IMPACTED EAR WAX UNI: CPT

## 2023-05-04 PROCEDURE — 99214 OFFICE O/P EST MOD 30 MIN: CPT | Mod: 25

## 2023-05-04 NOTE — HISTORY OF PRESENT ILLNESS
[FreeTextEntry8] : patient present with ear pain on the right side for about a week\par she has no cold she could have allergies she could have wax\par  what can she do about her weight. ? \par she is in pain. she is supposed to get infusions what do I think of  them

## 2023-05-04 NOTE — ASSESSMENT
[FreeTextEntry1] : pt presents with ear wax. irrigated with good results\par pt advised she is going to have  infusions for RA. she is not able to exercise or get motivated to lose the weight bc of the pain . i talked about the sleeve. I talked about ozempic . we can try to get it approved under prediabetes. she wants to wait till endo and rheum see her. \par

## 2023-10-01 PROBLEM — L30.8 PRURITIC DERMATITIS: Status: RESOLVED | Noted: 2018-02-09 | Resolved: 2023-02-28

## 2024-01-29 ENCOUNTER — APPOINTMENT (OUTPATIENT)
Dept: FAMILY MEDICINE | Facility: CLINIC | Age: 59
End: 2024-01-29
Payer: COMMERCIAL

## 2024-01-29 VITALS
RESPIRATION RATE: 15 BRPM | OXYGEN SATURATION: 98 % | BODY MASS INDEX: 44.51 KG/M2 | WEIGHT: 267.13 LBS | HEIGHT: 65 IN | TEMPERATURE: 98.2 F | SYSTOLIC BLOOD PRESSURE: 120 MMHG | DIASTOLIC BLOOD PRESSURE: 68 MMHG | HEART RATE: 92 BPM

## 2024-01-29 DIAGNOSIS — R50.9 FEVER, UNSPECIFIED: ICD-10-CM

## 2024-01-29 PROCEDURE — 36415 COLL VENOUS BLD VENIPUNCTURE: CPT

## 2024-01-29 PROCEDURE — 99214 OFFICE O/P EST MOD 30 MIN: CPT

## 2024-01-30 LAB
ALBUMIN SERPL ELPH-MCNC: 4.3 G/DL
ALP BLD-CCNC: 60 U/L
ALT SERPL-CCNC: 21 U/L
ANION GAP SERPL CALC-SCNC: 11 MMOL/L
AST SERPL-CCNC: 18 U/L
BASOPHILS # BLD AUTO: 0.02 K/UL
BASOPHILS NFR BLD AUTO: 0.2 %
BILIRUB SERPL-MCNC: 1.5 MG/DL
BUN SERPL-MCNC: 12 MG/DL
CALCIUM SERPL-MCNC: 9.4 MG/DL
CHLORIDE SERPL-SCNC: 103 MMOL/L
CO2 SERPL-SCNC: 24 MMOL/L
CREAT SERPL-MCNC: 0.69 MG/DL
EGFR: 101 ML/MIN/1.73M2
EOSINOPHIL # BLD AUTO: 0.08 K/UL
EOSINOPHIL NFR BLD AUTO: 0.7 %
GLUCOSE SERPL-MCNC: 107 MG/DL
HCT VFR BLD CALC: 42.5 %
HGB BLD-MCNC: 13.9 G/DL
IMM GRANULOCYTES NFR BLD AUTO: 0.2 %
LACTATE BLDA-MCNC: 0.7 MMOL/L
LYMPHOCYTES # BLD AUTO: 1.42 K/UL
LYMPHOCYTES NFR BLD AUTO: 13.3 %
MAN DIFF?: NORMAL
MCHC RBC-ENTMCNC: 30.2 PG
MCHC RBC-ENTMCNC: 32.7 GM/DL
MCV RBC AUTO: 92.4 FL
MONOCYTES # BLD AUTO: 0.46 K/UL
MONOCYTES NFR BLD AUTO: 4.3 %
NEUTROPHILS # BLD AUTO: 8.68 K/UL
NEUTROPHILS NFR BLD AUTO: 81.3 %
PLATELET # BLD AUTO: 264 K/UL
POTASSIUM SERPL-SCNC: 4.4 MMOL/L
PROT SERPL-MCNC: 6.7 G/DL
RBC # BLD: 4.6 M/UL
RBC # FLD: 13.6 %
SODIUM SERPL-SCNC: 138 MMOL/L
WBC # FLD AUTO: 10.68 K/UL

## 2024-01-30 NOTE — HISTORY OF PRESENT ILLNESS
[Abdominal Pain] : abdominal pain [Severe] : severe [___ Days ago] : [unfilled] days ago [FreeTextEntry8] : PARDEEP TILLEY is a 58 year female who presents today Jan 29, 2024 with acute left lower abdomen pain (soreness) x 1 day that is associated with +chills, +feels feverish, +lethargy, +loose stools. Her symptoms started shortly after eating at a home cooked meal Asian noodle meal from  [employer]. Last BM was yesterday. She has been taking ibuprofen which helps her pain. Right now pain is 5 out of 10 "soreness" Today she was able to eat scrambled eggs and chicken noodle soup. She has concerns for diverticulitis. She reports recent colonoscopy from Dr. Jeong

## 2024-01-30 NOTE — PHYSICAL EXAM
[No Acute Distress] : no acute distress [Ill-Appearing] : ill-appearing [Looks Tired] : appears tired [Normal] : normal rate, regular rhythm, normal S1 and S2 and no murmur heard [Soft] : abdomen soft [Obese] : obese [Coordination Grossly Intact] : coordination grossly intact [No Focal Deficits] : no focal deficits [Normal Gait] : normal gait [Normal Affect] : the affect was normal [Alert and Oriented x3] : oriented to person, place, and time [Normal Insight/Judgement] : insight and judgment were intact [de-identified] : +LLQ tenderness

## 2024-01-30 NOTE — ADDENDUM
[FreeTextEntry1] : CT scan and pelvis: Acute sigmoid uncomplicated diverticulitis. Will treat with Cipro and Flagyl. Advised clear liquid diet and advance as tolerated. Follow up with GI.

## 2024-01-30 NOTE — ASSESSMENT
[FreeTextEntry1] : Left lower quadrant pain, chills, lethargy. - will order STAT CT scan of ABD and Pelvis to r/o diverticulitis - will obtain GI records - Advised rest, oral hydration, clear fluids only.  - Continue Tylenol or Ibuprofen PRN pain

## 2024-01-30 NOTE — REVIEW OF SYSTEMS
[Chills] : chills [Fatigue] : fatigue [Abdominal Pain] : abdominal pain [Negative] : Respiratory [Fever] : no fever [Nausea] : no nausea [Constipation] : no constipation [Diarrhea] : diarrhea [Vomiting] : no vomiting

## 2024-05-10 ENCOUNTER — RX RENEWAL (OUTPATIENT)
Age: 59
End: 2024-05-10

## 2024-05-17 ENCOUNTER — APPOINTMENT (OUTPATIENT)
Dept: FAMILY MEDICINE | Facility: CLINIC | Age: 59
End: 2024-05-17
Payer: COMMERCIAL

## 2024-05-17 VITALS
BODY MASS INDEX: 43.82 KG/M2 | DIASTOLIC BLOOD PRESSURE: 70 MMHG | OXYGEN SATURATION: 98 % | WEIGHT: 263 LBS | HEIGHT: 65 IN | RESPIRATION RATE: 12 BRPM | HEART RATE: 62 BPM | SYSTOLIC BLOOD PRESSURE: 120 MMHG

## 2024-05-17 DIAGNOSIS — H92.01 OTALGIA, RIGHT EAR: ICD-10-CM

## 2024-05-17 DIAGNOSIS — Z20.822 CONTACT WITH AND (SUSPECTED) EXPOSURE TO COVID-19: ICD-10-CM

## 2024-05-17 DIAGNOSIS — K57.92 DIVERTICULITIS OF INTESTINE, PART UNSPECIFIED, W/OUT PERFORATION OR ABSCESS W/OUT BLEEDING: ICD-10-CM

## 2024-05-17 DIAGNOSIS — Z00.01 ENCOUNTER FOR GENERAL ADULT MEDICAL EXAMINATION WITH ABNORMAL FINDINGS: ICD-10-CM

## 2024-05-17 DIAGNOSIS — R68.83 CHILLS (WITHOUT FEVER): ICD-10-CM

## 2024-05-17 DIAGNOSIS — R10.32 LEFT LOWER QUADRANT PAIN: ICD-10-CM

## 2024-05-17 DIAGNOSIS — H10.10 ACUTE ATOPIC CONJUNCTIVITIS, UNSPECIFIED EYE: ICD-10-CM

## 2024-05-17 DIAGNOSIS — E03.9 HYPOTHYROIDISM, UNSPECIFIED: ICD-10-CM

## 2024-05-17 LAB
ALBUMIN SERPL ELPH-MCNC: 4.4 G/DL
ALP BLD-CCNC: 68 U/L
ALT SERPL-CCNC: 21 U/L
ANION GAP SERPL CALC-SCNC: 12 MMOL/L
AST SERPL-CCNC: 19 U/L
BASOPHILS # BLD AUTO: 0.05 K/UL
BASOPHILS NFR BLD AUTO: 0.9 %
BILIRUB SERPL-MCNC: 0.8 MG/DL
BUN SERPL-MCNC: 19 MG/DL
CALCIUM SERPL-MCNC: 9.8 MG/DL
CHLORIDE SERPL-SCNC: 104 MMOL/L
CHOLEST SERPL-MCNC: 215 MG/DL
CO2 SERPL-SCNC: 24 MMOL/L
CREAT SERPL-MCNC: 0.59 MG/DL
EGFR: 105 ML/MIN/1.73M2
EOSINOPHIL # BLD AUTO: 0.23 K/UL
EOSINOPHIL NFR BLD AUTO: 4 %
GLUCOSE SERPL-MCNC: 93 MG/DL
HCT VFR BLD CALC: 42.4 %
HDLC SERPL-MCNC: 72 MG/DL
HGB BLD-MCNC: 13.8 G/DL
IMM GRANULOCYTES NFR BLD AUTO: 0.2 %
LDLC SERPL CALC-MCNC: 130 MG/DL
LYMPHOCYTES # BLD AUTO: 1.43 K/UL
LYMPHOCYTES NFR BLD AUTO: 24.8 %
MAN DIFF?: NORMAL
MCHC RBC-ENTMCNC: 30.3 PG
MCHC RBC-ENTMCNC: 32.5 GM/DL
MCV RBC AUTO: 93 FL
MONOCYTES # BLD AUTO: 0.37 K/UL
MONOCYTES NFR BLD AUTO: 6.4 %
NEUTROPHILS # BLD AUTO: 3.67 K/UL
NEUTROPHILS NFR BLD AUTO: 63.7 %
NONHDLC SERPL-MCNC: 143 MG/DL
PLATELET # BLD AUTO: 271 K/UL
POTASSIUM SERPL-SCNC: 4.4 MMOL/L
PROT SERPL-MCNC: 6.8 G/DL
RBC # BLD: 4.56 M/UL
RBC # FLD: 14.3 %
SODIUM SERPL-SCNC: 141 MMOL/L
T3 SERPL-MCNC: 104 NG/DL
T3FREE SERPL-MCNC: 2.61 PG/ML
T4 FREE SERPL-MCNC: 1.8 NG/DL
TRIGL SERPL-MCNC: 78 MG/DL
TSH SERPL-ACNC: 0.85 UIU/ML
WBC # FLD AUTO: 5.76 K/UL

## 2024-05-17 PROCEDURE — 99213 OFFICE O/P EST LOW 20 MIN: CPT

## 2024-05-17 PROCEDURE — 36415 COLL VENOUS BLD VENIPUNCTURE: CPT

## 2024-05-17 RX ORDER — CIPROFLOXACIN HYDROCHLORIDE 500 MG/1
500 TABLET, FILM COATED ORAL
Qty: 20 | Refills: 0 | Status: COMPLETED | COMMUNITY
Start: 2024-01-30 | End: 2024-05-17

## 2024-05-17 RX ORDER — IPRATROPIUM BROMIDE 42 UG/1
0.06 SPRAY NASAL
Qty: 1 | Refills: 1 | Status: ACTIVE | COMMUNITY
Start: 2024-05-17 | End: 1900-01-01

## 2024-05-17 RX ORDER — IBUPROFEN 800 MG/1
800 TABLET, FILM COATED ORAL
Refills: 0 | Status: ACTIVE | COMMUNITY

## 2024-05-17 RX ORDER — NAPROXEN 500 MG/1
500 TABLET ORAL
Refills: 0 | Status: DISCONTINUED | COMMUNITY
End: 2024-05-17

## 2024-05-17 RX ORDER — METRONIDAZOLE 500 MG/1
500 TABLET ORAL EVERY 8 HOURS
Qty: 30 | Refills: 0 | Status: COMPLETED | COMMUNITY
Start: 2024-01-30 | End: 2024-05-17

## 2024-05-17 RX ORDER — LEVOTHYROXINE SODIUM 0.14 MG/1
137 TABLET ORAL
Qty: 90 | Refills: 2 | Status: ACTIVE | COMMUNITY
Start: 2016-11-03 | End: 1900-01-01

## 2024-05-17 NOTE — REVIEW OF SYSTEMS
[Redness] : redness [Itching] : itching [Nasal Discharge] : nasal discharge [Joint Pain] : joint pain [Joint Stiffness] : joint stiffness [Negative] : Genitourinary

## 2024-05-17 NOTE — HISTORY OF PRESENT ILLNESS
[FreeTextEntry1] : pt presents for med follow up  [de-identified] : 59 yo wf hx hypothyroidism RA  and Psoriatic Arthritis  here to follow up on thyroid  i am on weight watchers daughter getting   october 19 th . sridhar mosley [facility].

## 2024-05-17 NOTE — ASSESSMENT
[FreeTextEntry1] : hypothyroid  Basic cardiovascular prevention measures are advised including regular exercise, surveillance medical examination, and prudent portion-controlled low fat diet, rich in a variety of vegetables with minimal added sugars, refined starches, and no artificially hydrogenated oils. Discussion and interpretation of previous tests , external notes( labs, radiology, specialist  , patient verbalized understanding. levothyroxine 137 mcg qd  90 Labs drawn/ specimens obtained  in office on this date of service  for evaluation of   assessed conditions -      to be run at [facility]. allergies ipratropium bromide .06 2 sp q nos qd azelastine eye drops bid

## 2024-05-17 NOTE — HEALTH RISK ASSESSMENT
[No] : No [No falls in past year] : Patient reported no falls in the past year [de-identified] : rheum [de-identified] : weight watchers

## 2024-05-17 NOTE — PHYSICAL EXAM
[Normal] : normal rate, regular rhythm, normal S1 and S2 and no murmur heard [de-identified] : red swollen

## 2024-05-18 LAB
ESTIMATED AVERAGE GLUCOSE: 108 MG/DL
HBA1C MFR BLD HPLC: 5.4 %

## 2024-06-02 ENCOUNTER — RX RENEWAL (OUTPATIENT)
Age: 59
End: 2024-06-02

## 2024-06-02 RX ORDER — AZELASTINE HYDROCHLORIDE 0.5 MG/ML
0.05 SOLUTION/ DROPS OPHTHALMIC
Qty: 6 | Refills: 0 | Status: ACTIVE | COMMUNITY
Start: 2024-05-17 | End: 1900-01-01

## 2024-07-06 ENCOUNTER — RX CHANGE (OUTPATIENT)
Age: 59
End: 2024-07-06

## 2024-07-06 RX ORDER — AZELASTINE HYDROCHLORIDE 0.5 MG/ML
0.05 SOLUTION/ DROPS OPHTHALMIC
Qty: 6 | Refills: 0 | Status: ACTIVE | COMMUNITY
Start: 2024-07-06 | End: 1900-01-01

## 2024-08-06 ENCOUNTER — RX RENEWAL (OUTPATIENT)
Age: 59
End: 2024-08-06

## 2024-08-21 DIAGNOSIS — Z92.29 PERSONAL HISTORY OF OTHER DRUG THERAPY: ICD-10-CM

## 2024-12-04 ENCOUNTER — APPOINTMENT (OUTPATIENT)
Dept: FAMILY MEDICINE | Facility: CLINIC | Age: 59
End: 2024-12-04
Payer: COMMERCIAL

## 2024-12-04 VITALS
WEIGHT: 258 LBS | HEIGHT: 65 IN | TEMPERATURE: 97.9 F | OXYGEN SATURATION: 97 % | DIASTOLIC BLOOD PRESSURE: 80 MMHG | BODY MASS INDEX: 42.99 KG/M2 | HEART RATE: 64 BPM | RESPIRATION RATE: 12 BRPM | SYSTOLIC BLOOD PRESSURE: 128 MMHG

## 2024-12-04 DIAGNOSIS — J01.10 ACUTE FRONTAL SINUSITIS, UNSPECIFIED: ICD-10-CM

## 2024-12-04 DIAGNOSIS — R55 SYNCOPE AND COLLAPSE: ICD-10-CM

## 2024-12-04 PROCEDURE — 99214 OFFICE O/P EST MOD 30 MIN: CPT

## 2024-12-04 RX ORDER — IPRATROPIUM BROMIDE 42 UG/1
0.06 SPRAY NASAL
Qty: 1 | Refills: 0 | Status: ACTIVE | COMMUNITY
Start: 2024-12-04 | End: 1900-01-01

## 2024-12-04 RX ORDER — CEFPROZIL 500 MG/1
500 TABLET ORAL
Qty: 20 | Refills: 0 | Status: ACTIVE | COMMUNITY
Start: 2024-12-04 | End: 1900-01-01

## 2025-03-03 ENCOUNTER — APPOINTMENT (OUTPATIENT)
Dept: FAMILY MEDICINE | Facility: CLINIC | Age: 60
End: 2025-03-03
Payer: COMMERCIAL

## 2025-03-03 VITALS
SYSTOLIC BLOOD PRESSURE: 130 MMHG | OXYGEN SATURATION: 98 % | HEART RATE: 80 BPM | WEIGHT: 264 LBS | DIASTOLIC BLOOD PRESSURE: 78 MMHG | BODY MASS INDEX: 43.99 KG/M2 | RESPIRATION RATE: 12 BRPM | HEIGHT: 65 IN

## 2025-03-03 DIAGNOSIS — R06.83 SNORING: ICD-10-CM

## 2025-03-03 DIAGNOSIS — E66.01 MORBID (SEVERE) OBESITY DUE TO EXCESS CALORIES: ICD-10-CM

## 2025-03-03 DIAGNOSIS — L40.50 ARTHROPATHIC PSORIASIS, UNSPECIFIED: ICD-10-CM

## 2025-03-03 DIAGNOSIS — E03.9 HYPOTHYROIDISM, UNSPECIFIED: ICD-10-CM

## 2025-03-03 DIAGNOSIS — H10.10 ACUTE ATOPIC CONJUNCTIVITIS, UNSPECIFIED EYE: ICD-10-CM

## 2025-03-03 DIAGNOSIS — M06.9 RHEUMATOID ARTHRITIS, UNSPECIFIED: ICD-10-CM

## 2025-03-03 PROCEDURE — 36415 COLL VENOUS BLD VENIPUNCTURE: CPT

## 2025-03-03 PROCEDURE — 99214 OFFICE O/P EST MOD 30 MIN: CPT

## 2025-03-03 RX ORDER — TIRZEPATIDE 2.5 MG/.5ML
2.5 INJECTION, SOLUTION SUBCUTANEOUS
Qty: 1 | Refills: 0 | Status: ACTIVE | COMMUNITY
Start: 2025-03-03 | End: 1900-01-01

## 2025-03-04 LAB
ALBUMIN SERPL ELPH-MCNC: 4.5 G/DL
ALP BLD-CCNC: 77 U/L
ALT SERPL-CCNC: 23 U/L
ANION GAP SERPL CALC-SCNC: 11 MMOL/L
AST SERPL-CCNC: 20 U/L
BASOPHILS # BLD AUTO: 0.05 K/UL
BASOPHILS NFR BLD AUTO: 0.8 %
BILIRUB SERPL-MCNC: 0.8 MG/DL
BUN SERPL-MCNC: 17 MG/DL
CALCIUM SERPL-MCNC: 9.5 MG/DL
CHLORIDE SERPL-SCNC: 104 MMOL/L
CO2 SERPL-SCNC: 26 MMOL/L
CREAT SERPL-MCNC: 0.68 MG/DL
EGFRCR SERPLBLD CKD-EPI 2021: 101 ML/MIN/1.73M2
EOSINOPHIL # BLD AUTO: 0.14 K/UL
EOSINOPHIL NFR BLD AUTO: 2.2 %
ESTIMATED AVERAGE GLUCOSE: 111 MG/DL
FERRITIN SERPL-MCNC: 134 NG/ML
FOLATE SERPL-MCNC: >20 NG/ML
GLUCOSE SERPL-MCNC: 93 MG/DL
HBA1C MFR BLD HPLC: 5.5 %
HCT VFR BLD CALC: 42.6 %
HGB BLD-MCNC: 14.1 G/DL
IMM GRANULOCYTES NFR BLD AUTO: 0.2 %
IRON SERPL-MCNC: 74 UG/DL
LYMPHOCYTES # BLD AUTO: 2.34 K/UL
LYMPHOCYTES NFR BLD AUTO: 36.6 %
MAGNESIUM SERPL-MCNC: 1.9 MG/DL
MAN DIFF?: NORMAL
MCHC RBC-ENTMCNC: 30.8 PG
MCHC RBC-ENTMCNC: 33.1 G/DL
MCV RBC AUTO: 93 FL
MONOCYTES # BLD AUTO: 0.49 K/UL
MONOCYTES NFR BLD AUTO: 7.7 %
NEUTROPHILS # BLD AUTO: 3.37 K/UL
NEUTROPHILS NFR BLD AUTO: 52.5 %
PLATELET # BLD AUTO: 273 K/UL
POTASSIUM SERPL-SCNC: 4.4 MMOL/L
PROT SERPL-MCNC: 6.8 G/DL
RBC # BLD: 4.58 M/UL
RBC # FLD: 14 %
SODIUM SERPL-SCNC: 141 MMOL/L
T3 SERPL-MCNC: 100 NG/DL
T3FREE SERPL-MCNC: 2.74 PG/ML
T4 FREE SERPL-MCNC: 1.7 NG/DL
TSH SERPL-ACNC: 0.84 UIU/ML
VIT B12 SERPL-MCNC: 655 PG/ML
WBC # FLD AUTO: 6.4 K/UL

## 2025-03-12 ENCOUNTER — OUTPATIENT (OUTPATIENT)
Dept: OUTPATIENT SERVICES | Facility: HOSPITAL | Age: 60
LOS: 1 days | End: 2025-03-12
Payer: COMMERCIAL

## 2025-03-12 DIAGNOSIS — G47.33 OBSTRUCTIVE SLEEP APNEA (ADULT) (PEDIATRIC): ICD-10-CM

## 2025-03-12 PROCEDURE — 95800 SLP STDY UNATTENDED: CPT

## 2025-03-12 PROCEDURE — 95800 SLP STDY UNATTENDED: CPT | Mod: 26

## 2025-04-14 ENCOUNTER — RX CHANGE (OUTPATIENT)
Age: 60
End: 2025-04-14

## 2025-04-14 RX ORDER — AZELASTINE HYDROCHLORIDE 0.5 MG/ML
0.05 SOLUTION/ DROPS OPHTHALMIC
Qty: 3 | Refills: 0 | Status: ACTIVE | COMMUNITY
Start: 2025-04-14 | End: 1900-01-01

## 2025-04-15 ENCOUNTER — APPOINTMENT (OUTPATIENT)
Dept: FAMILY MEDICINE | Facility: CLINIC | Age: 60
End: 2025-04-15
Payer: COMMERCIAL

## 2025-04-15 VITALS
HEART RATE: 72 BPM | WEIGHT: 257 LBS | DIASTOLIC BLOOD PRESSURE: 70 MMHG | RESPIRATION RATE: 14 BRPM | OXYGEN SATURATION: 98 % | SYSTOLIC BLOOD PRESSURE: 130 MMHG | HEIGHT: 65 IN | BODY MASS INDEX: 42.82 KG/M2

## 2025-04-15 DIAGNOSIS — M06.9 RHEUMATOID ARTHRITIS, UNSPECIFIED: ICD-10-CM

## 2025-04-15 DIAGNOSIS — E66.01 MORBID (SEVERE) OBESITY DUE TO EXCESS CALORIES: ICD-10-CM

## 2025-04-15 DIAGNOSIS — R50.9 FEVER, UNSPECIFIED: ICD-10-CM

## 2025-04-15 PROCEDURE — 99214 OFFICE O/P EST MOD 30 MIN: CPT

## 2025-05-12 ENCOUNTER — NON-APPOINTMENT (OUTPATIENT)
Age: 60
End: 2025-05-12

## 2025-05-13 ENCOUNTER — APPOINTMENT (OUTPATIENT)
Dept: FAMILY MEDICINE | Facility: CLINIC | Age: 60
End: 2025-05-13
Payer: COMMERCIAL

## 2025-05-13 VITALS — WEIGHT: 250.6 LBS | BODY MASS INDEX: 41.7 KG/M2

## 2025-05-13 DIAGNOSIS — E66.01 MORBID (SEVERE) OBESITY DUE TO EXCESS CALORIES: ICD-10-CM

## 2025-05-13 DIAGNOSIS — M06.9 RHEUMATOID ARTHRITIS, UNSPECIFIED: ICD-10-CM

## 2025-05-13 DIAGNOSIS — E03.9 HYPOTHYROIDISM, UNSPECIFIED: ICD-10-CM

## 2025-05-13 DIAGNOSIS — L40.50 ARTHROPATHIC PSORIASIS, UNSPECIFIED: ICD-10-CM

## 2025-05-13 PROCEDURE — 99213 OFFICE O/P EST LOW 20 MIN: CPT | Mod: 95

## 2025-06-18 ENCOUNTER — APPOINTMENT (OUTPATIENT)
Dept: FAMILY MEDICINE | Facility: CLINIC | Age: 60
End: 2025-06-18
Payer: COMMERCIAL

## 2025-06-18 VITALS — WEIGHT: 243 LBS | BODY MASS INDEX: 40.44 KG/M2

## 2025-06-18 PROCEDURE — 99214 OFFICE O/P EST MOD 30 MIN: CPT | Mod: 95

## 2025-07-15 ENCOUNTER — APPOINTMENT (OUTPATIENT)
Dept: FAMILY MEDICINE | Facility: CLINIC | Age: 60
End: 2025-07-15
Payer: COMMERCIAL

## 2025-07-15 VITALS — BODY MASS INDEX: 40.1 KG/M2 | WEIGHT: 241 LBS

## 2025-07-15 PROCEDURE — 99213 OFFICE O/P EST LOW 20 MIN: CPT | Mod: 95

## 2025-08-15 ENCOUNTER — APPOINTMENT (OUTPATIENT)
Dept: FAMILY MEDICINE | Facility: CLINIC | Age: 60
End: 2025-08-15
Payer: COMMERCIAL

## 2025-08-15 VITALS — BODY MASS INDEX: 39.77 KG/M2 | WEIGHT: 239 LBS

## 2025-08-15 DIAGNOSIS — E66.01 MORBID (SEVERE) OBESITY DUE TO EXCESS CALORIES: ICD-10-CM

## 2025-08-15 DIAGNOSIS — M06.9 RHEUMATOID ARTHRITIS, UNSPECIFIED: ICD-10-CM

## 2025-08-15 PROCEDURE — 99213 OFFICE O/P EST LOW 20 MIN: CPT | Mod: 95

## 2025-09-05 ENCOUNTER — APPOINTMENT (OUTPATIENT)
Dept: FAMILY MEDICINE | Facility: CLINIC | Age: 60
End: 2025-09-05
Payer: COMMERCIAL

## 2025-09-05 VITALS — BODY MASS INDEX: 39.27 KG/M2 | WEIGHT: 236 LBS

## 2025-09-05 DIAGNOSIS — E66.01 MORBID (SEVERE) OBESITY DUE TO EXCESS CALORIES: ICD-10-CM

## 2025-09-05 PROCEDURE — 99213 OFFICE O/P EST LOW 20 MIN: CPT | Mod: 95
